# Patient Record
Sex: MALE | NOT HISPANIC OR LATINO | URBAN - METROPOLITAN AREA
[De-identification: names, ages, dates, MRNs, and addresses within clinical notes are randomized per-mention and may not be internally consistent; named-entity substitution may affect disease eponyms.]

---
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
## 2020-11-06 NOTE — ED PROVIDER NOTE - NS ED ROS FT
Constitutional: No fever or chills  Eyes: No visual changes  HEENT: No throat pain  CV: No chest pain  Resp: No SOB no cough  GI: No abd pain, nausea or vomiting  : No dysuria  MSK: As per hpi  Skin: No rash  Neuro: No headache Ketoconazole Counseling:   Patient counseled regarding improving absorption with orange juice.  Adverse effects include but are not limited to breast enlargement, headache, diarrhea, nausea, upset stomach, liver function test abnormalities, taste disturbance, and stomach pain.  There is a rare possibility of liver failure that can occur when taking ketoconazole. The patient understands that monitoring of LFTs may be required, especially at baseline. The patient verbalized understanding of the proper use and possible adverse effects of ketoconazole.  All of the patient's questions and concerns were addressed.

## 2023-01-01 NOTE — ED PEDIATRIC NURSE NOTE - NS ED NURSE LEVEL OF CONSCIOUSNESS SPEECH
Orientation to room/Bed in low position, brakes on/Side rails x 2 or 4 up, assess large gaps, such that a patient could get extremity or other body part entrapped, use additional safety procedures/Call light is within reach, educate patient/family on its functionality/Document fall prevention teaching and include in plan of care
Speaking Coherently

## 2024-01-13 NOTE — CONSULT NOTE ADULT - CONSULT REQUESTED BY NAME
Jeremías Verde  Is a 87-year-old male with poor bladder emptying, resultant bladder calculi, very recent cystoscopy litholapaxy, left ureteroscopy and left ureteral stent placement.  Office cystoscopy and stent removal 1/9 patient is admitted for other medical concerns.  However, was contacted by the internal medicine service to review CT scan.    There is evidence of mild left hydronephrosis.  And a small 4 mm nonobstructing dependent bladder calculus.  Positive BPH.    All findings are consistent with recent instrumentation of left ureter as well as stone fragmentation.  No indication for  tract manipulation at this time.     ED attending